# Patient Record
Sex: FEMALE | Race: WHITE | Employment: OTHER
[De-identification: names, ages, dates, MRNs, and addresses within clinical notes are randomized per-mention and may not be internally consistent; named-entity substitution may affect disease eponyms.]

---

## 2024-09-28 ENCOUNTER — APPOINTMENT (OUTPATIENT)
Facility: HOSPITAL | Age: 74
End: 2024-09-28
Payer: MEDICARE

## 2024-09-28 ENCOUNTER — HOSPITAL ENCOUNTER (EMERGENCY)
Facility: HOSPITAL | Age: 74
Discharge: HOME | End: 2024-09-28
Attending: EMERGENCY MEDICINE
Payer: MEDICARE

## 2024-09-28 VITALS
HEART RATE: 76 BPM | SYSTOLIC BLOOD PRESSURE: 140 MMHG | OXYGEN SATURATION: 99 % | RESPIRATION RATE: 16 BRPM | DIASTOLIC BLOOD PRESSURE: 87 MMHG

## 2024-09-28 DIAGNOSIS — S00.83XA CONTUSION OF FOREHEAD, INITIAL ENCOUNTER: ICD-10-CM

## 2024-09-28 DIAGNOSIS — S63.501A SPRAIN OF RIGHT WRIST, INITIAL ENCOUNTER: Primary | ICD-10-CM
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
## 2024-09-28 PROCEDURE — 99284 EMERGENCY DEPT VISIT MOD MDM: CPT

## 2024-09-28 PROCEDURE — 73130 X-RAY EXAM OF HAND: CPT

## 2024-09-28 PROCEDURE — 70450 CT HEAD/BRAIN W/O DYE: CPT

## 2024-09-28 RX ORDER — ASPIRIN 81 MG/1
81 TABLET, CHEWABLE ORAL DAILY
COMMUNITY

## 2024-09-28 ASSESSMENT — PAIN DESCRIPTION - DESCRIPTORS: DESCRIPTORS: ACHING

## 2024-09-28 ASSESSMENT — PAIN DESCRIPTION - LOCATION: LOCATION: HEAD;HAND

## 2024-09-28 ASSESSMENT — PAIN SCALES - GENERAL
PAINLEVEL_OUTOF10: 2
PAINLEVEL_OUTOF10: 6

## 2024-09-28 ASSESSMENT — PAIN - FUNCTIONAL ASSESSMENT
PAIN_FUNCTIONAL_ASSESSMENT: 0-10
PAIN_FUNCTIONAL_ASSESSMENT: 0-10
PAIN_FUNCTIONAL_ASSESSMENT: PREVENTS OR INTERFERES SOME ACTIVE ACTIVITIES AND ADLS

## 2024-09-28 ASSESSMENT — VISUAL ACUITY: OU: 1

## 2024-09-28 ASSESSMENT — PAIN DESCRIPTION - PAIN TYPE: TYPE: ACUTE PAIN

## 2024-09-28 ASSESSMENT — PAIN DESCRIPTION - ORIENTATION: ORIENTATION: RIGHT

## 2024-09-28 NOTE — ED TRIAGE NOTES
[place], some swelling with small abrasion to L side of face and eyebrow. Takes baby aspirin, neg LOC, took tylenol at 1130pm.
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
